# Patient Record
Sex: MALE | Race: WHITE | Employment: FULL TIME | URBAN - NONMETROPOLITAN AREA
[De-identification: names, ages, dates, MRNs, and addresses within clinical notes are randomized per-mention and may not be internally consistent; named-entity substitution may affect disease eponyms.]

---

## 2017-02-28 ENCOUNTER — TELEPHONE (OUTPATIENT)
Dept: FAMILY MEDICINE CLINIC | Age: 42
End: 2017-02-28

## 2017-03-06 ENCOUNTER — HOSPITAL ENCOUNTER (EMERGENCY)
Age: 42
Discharge: HOME | End: 2017-03-06
Payer: COMMERCIAL

## 2017-03-06 ENCOUNTER — APPOINTMENT (OUTPATIENT)
Dept: CT IMAGING | Age: 42
End: 2017-03-06
Payer: COMMERCIAL

## 2017-03-06 ENCOUNTER — APPOINTMENT (OUTPATIENT)
Dept: GENERAL RADIOLOGY | Age: 42
End: 2017-03-06
Payer: COMMERCIAL

## 2017-03-06 VITALS
HEART RATE: 74 BPM | WEIGHT: 170 LBS | DIASTOLIC BLOOD PRESSURE: 80 MMHG | OXYGEN SATURATION: 97 % | SYSTOLIC BLOOD PRESSURE: 117 MMHG | BODY MASS INDEX: 25.18 KG/M2 | RESPIRATION RATE: 20 BRPM | HEIGHT: 69 IN | TEMPERATURE: 98.2 F

## 2017-03-06 DIAGNOSIS — R07.81 RIB PAIN ON RIGHT SIDE: ICD-10-CM

## 2017-03-06 DIAGNOSIS — H81.10 BENIGN PAROXYSMAL POSITIONAL VERTIGO, UNSPECIFIED LATERALITY: Primary | ICD-10-CM

## 2017-03-06 LAB
ALBUMIN SERPL-MCNC: 4.1 G/DL (ref 3.9–4.9)
ALP BLD-CCNC: 67 U/L (ref 35–104)
ALT SERPL-CCNC: 16 U/L (ref 0–41)
AMPHETAMINE SCREEN, URINE: NORMAL
ANION GAP SERPL CALCULATED.3IONS-SCNC: 9 MEQ/L (ref 7–13)
AST SERPL-CCNC: 14 U/L (ref 0–40)
BARBITURATE SCREEN URINE: NORMAL
BASOPHILS ABSOLUTE: 0.1 K/UL (ref 0–0.2)
BASOPHILS RELATIVE PERCENT: 0.8 %
BENZODIAZEPINE SCREEN, URINE: NORMAL
BILIRUB SERPL-MCNC: 0.2 MG/DL (ref 0–1.2)
BILIRUBIN URINE: NEGATIVE
BLOOD, URINE: NEGATIVE
BUN BLDV-MCNC: 14 MG/DL (ref 6–20)
CALCIUM SERPL-MCNC: 8.9 MG/DL (ref 8.6–10.2)
CANNABINOID SCREEN URINE: NORMAL
CHLORIDE BLD-SCNC: 103 MEQ/L (ref 98–107)
CLARITY: CLEAR
CO2: 25 MEQ/L (ref 22–29)
COCAINE METABOLITE SCREEN URINE: NORMAL
COLOR: YELLOW
CREAT SERPL-MCNC: 0.93 MG/DL (ref 0.7–1.2)
EOSINOPHILS ABSOLUTE: 0.1 K/UL (ref 0–0.7)
EOSINOPHILS RELATIVE PERCENT: 1.5 %
GFR AFRICAN AMERICAN: >60
GFR NON-AFRICAN AMERICAN: >60
GLOBULIN: 2.3 G/DL (ref 2.3–3.5)
GLUCOSE BLD-MCNC: 96 MG/DL (ref 74–109)
GLUCOSE URINE: NEGATIVE MG/DL
HCT VFR BLD CALC: 44.4 % (ref 42–52)
HEMOGLOBIN: 14.9 G/DL (ref 14–18)
KETONES, URINE: NEGATIVE MG/DL
LEUKOCYTE ESTERASE, URINE: NEGATIVE
LYMPHOCYTES ABSOLUTE: 4.3 K/UL (ref 1–4.8)
LYMPHOCYTES RELATIVE PERCENT: 46.6 %
Lab: NORMAL
MAGNESIUM: 2.1 MG/DL (ref 1.7–2.3)
MCH RBC QN AUTO: 32.1 PG (ref 27–31.3)
MCHC RBC AUTO-ENTMCNC: 33.6 % (ref 33–37)
MCV RBC AUTO: 95.4 FL (ref 80–100)
MONOCYTES ABSOLUTE: 0.7 K/UL (ref 0.2–0.8)
MONOCYTES RELATIVE PERCENT: 7 %
NEUTROPHILS ABSOLUTE: 4.1 K/UL (ref 1.4–6.5)
NEUTROPHILS RELATIVE PERCENT: 44.1 %
NITRITE, URINE: NEGATIVE
OPIATE SCREEN URINE: NORMAL
PDW BLD-RTO: 13.2 % (ref 11.5–14.5)
PH UA: 6 (ref 5–9)
PHENCYCLIDINE SCREEN URINE: NORMAL
PLATELET # BLD: 272 K/UL (ref 130–400)
POTASSIUM SERPL-SCNC: 3.7 MEQ/L (ref 3.5–5.1)
PROTEIN UA: NEGATIVE MG/DL
RBC # BLD: 4.65 M/UL (ref 4.7–6.1)
SODIUM BLD-SCNC: 137 MEQ/L (ref 132–144)
SPECIFIC GRAVITY UA: 1.03 (ref 1–1.03)
TOTAL CK: 132 U/L (ref 0–190)
TOTAL PROTEIN: 6.4 G/DL (ref 6.4–8.1)
TROPONIN: <0.01 NG/ML (ref 0–0.01)
URINE REFLEX TO CULTURE: NORMAL
UROBILINOGEN, URINE: 0.2 E.U./DL
WBC # BLD: 9.4 K/UL (ref 4.8–10.8)

## 2017-03-06 PROCEDURE — 82550 ASSAY OF CK (CPK): CPT

## 2017-03-06 PROCEDURE — 80307 DRUG TEST PRSMV CHEM ANLYZR: CPT

## 2017-03-06 PROCEDURE — 99284 EMERGENCY DEPT VISIT MOD MDM: CPT

## 2017-03-06 PROCEDURE — 85025 COMPLETE CBC W/AUTO DIFF WBC: CPT

## 2017-03-06 PROCEDURE — 93005 ELECTROCARDIOGRAM TRACING: CPT

## 2017-03-06 PROCEDURE — 70450 CT HEAD/BRAIN W/O DYE: CPT

## 2017-03-06 PROCEDURE — 71101 X-RAY EXAM UNILAT RIBS/CHEST: CPT

## 2017-03-06 PROCEDURE — 81003 URINALYSIS AUTO W/O SCOPE: CPT

## 2017-03-06 PROCEDURE — 84484 ASSAY OF TROPONIN QUANT: CPT

## 2017-03-06 PROCEDURE — 36415 COLL VENOUS BLD VENIPUNCTURE: CPT

## 2017-03-06 PROCEDURE — 6370000000 HC RX 637 (ALT 250 FOR IP): Performed by: PHYSICIAN ASSISTANT

## 2017-03-06 PROCEDURE — 83735 ASSAY OF MAGNESIUM: CPT

## 2017-03-06 PROCEDURE — 80053 COMPREHEN METABOLIC PANEL: CPT

## 2017-03-06 RX ORDER — IBUPROFEN 800 MG/1
800 TABLET ORAL EVERY 8 HOURS PRN
Qty: 20 TABLET | Refills: 0 | Status: SHIPPED | OUTPATIENT
Start: 2017-03-06 | End: 2017-07-20 | Stop reason: SDUPTHER

## 2017-03-06 RX ORDER — MECLIZINE HYDROCHLORIDE 25 MG/1
25 TABLET ORAL ONCE
Status: COMPLETED | OUTPATIENT
Start: 2017-03-06 | End: 2017-03-06

## 2017-03-06 RX ORDER — MECLIZINE HYDROCHLORIDE 25 MG/1
25 TABLET ORAL 3 TIMES DAILY PRN
Qty: 20 TABLET | Refills: 0 | Status: SHIPPED | OUTPATIENT
Start: 2017-03-06 | End: 2017-03-16

## 2017-03-06 RX ADMIN — MECLIZINE HYDROCHLORIDE 25 MG: 25 TABLET ORAL at 18:46

## 2017-03-06 ASSESSMENT — ENCOUNTER SYMPTOMS
PHOTOPHOBIA: 0
VOICE CHANGE: 0
ABDOMINAL DISTENTION: 0
APNEA: 0
VOMITING: 0
COUGH: 0
BACK PAIN: 0
ANAL BLEEDING: 0
NAUSEA: 1
EYE DISCHARGE: 0

## 2017-03-07 LAB
EKG ATRIAL RATE: 64 BPM
EKG P AXIS: 65 DEGREES
EKG P-R INTERVAL: 136 MS
EKG Q-T INTERVAL: 390 MS
EKG QRS DURATION: 98 MS
EKG QTC CALCULATION (BAZETT): 402 MS
EKG R AXIS: 77 DEGREES
EKG T AXIS: 60 DEGREES
EKG VENTRICULAR RATE: 64 BPM

## 2017-06-26 ENCOUNTER — OFFICE VISIT (OUTPATIENT)
Dept: FAMILY MEDICINE CLINIC | Age: 42
End: 2017-06-26

## 2017-06-26 VITALS
SYSTOLIC BLOOD PRESSURE: 126 MMHG | WEIGHT: 177.4 LBS | TEMPERATURE: 98.3 F | HEIGHT: 69 IN | BODY MASS INDEX: 26.28 KG/M2 | HEART RATE: 68 BPM | OXYGEN SATURATION: 98 % | DIASTOLIC BLOOD PRESSURE: 88 MMHG

## 2017-06-26 DIAGNOSIS — R30.0 BURNING WITH URINATION: Primary | ICD-10-CM

## 2017-06-26 DIAGNOSIS — N45.1 EPIDIDYMITIS: ICD-10-CM

## 2017-06-26 DIAGNOSIS — N41.9 PROSTATITIS, UNSPECIFIED PROSTATITIS TYPE: ICD-10-CM

## 2017-06-26 LAB
BILIRUBIN, POC: ABNORMAL
BLOOD URINE, POC: ABNORMAL
CLARITY, POC: CLEAR
COLOR, POC: YELLOW
GLUCOSE URINE, POC: ABNORMAL
KETONES, POC: ABNORMAL
LEUKOCYTE EST, POC: ABNORMAL
NITRITE, POC: ABNORMAL
PH, POC: 5
PROTEIN, POC: ABNORMAL
SPECIFIC GRAVITY, POC: 1.01
UROBILINOGEN, POC: 0.2

## 2017-06-26 PROCEDURE — 81003 URINALYSIS AUTO W/O SCOPE: CPT | Performed by: FAMILY MEDICINE

## 2017-06-26 PROCEDURE — 99213 OFFICE O/P EST LOW 20 MIN: CPT | Performed by: FAMILY MEDICINE

## 2017-06-26 RX ORDER — CIPROFLOXACIN 500 MG/1
TABLET, FILM COATED ORAL
Refills: 0 | COMMUNITY
Start: 2017-06-21 | End: 2017-07-20 | Stop reason: SDUPTHER

## 2017-06-28 LAB — URINE CULTURE, ROUTINE: NORMAL

## 2017-07-20 ENCOUNTER — PATIENT MESSAGE (OUTPATIENT)
Dept: FAMILY MEDICINE CLINIC | Age: 42
End: 2017-07-20

## 2017-07-20 RX ORDER — IBUPROFEN 800 MG/1
800 TABLET ORAL EVERY 8 HOURS PRN
Qty: 20 TABLET | Refills: 0 | Status: SHIPPED | OUTPATIENT
Start: 2017-07-20 | End: 2018-05-01

## 2017-07-20 RX ORDER — CIPROFLOXACIN 500 MG/1
TABLET, FILM COATED ORAL
Qty: 28 TABLET | Refills: 0 | Status: SHIPPED | OUTPATIENT
Start: 2017-07-20 | End: 2017-11-30 | Stop reason: SDUPTHER

## 2017-11-29 ENCOUNTER — PATIENT MESSAGE (OUTPATIENT)
Dept: FAMILY MEDICINE CLINIC | Age: 42
End: 2017-11-29

## 2017-11-29 DIAGNOSIS — N48.89 PENILE PAIN: Primary | ICD-10-CM

## 2017-11-30 RX ORDER — CIPROFLOXACIN 500 MG/1
TABLET, FILM COATED ORAL
Qty: 28 TABLET | Refills: 0 | Status: SHIPPED | OUTPATIENT
Start: 2017-11-30 | End: 2018-05-01

## 2018-01-16 ENCOUNTER — PATIENT MESSAGE (OUTPATIENT)
Dept: FAMILY MEDICINE CLINIC | Age: 43
End: 2018-01-16

## 2018-01-16 DIAGNOSIS — B00.9 HERPES SIMPLEX: ICD-10-CM

## 2018-01-16 RX ORDER — VALACYCLOVIR HYDROCHLORIDE 500 MG/1
500 TABLET, FILM COATED ORAL 2 TIMES DAILY
Qty: 6 TABLET | Refills: 0 | Status: SHIPPED | OUTPATIENT
Start: 2018-01-16 | End: 2018-01-19

## 2018-05-01 ENCOUNTER — OFFICE VISIT (OUTPATIENT)
Dept: FAMILY MEDICINE CLINIC | Age: 43
End: 2018-05-01
Payer: COMMERCIAL

## 2018-05-01 VITALS
HEIGHT: 69 IN | SYSTOLIC BLOOD PRESSURE: 118 MMHG | BODY MASS INDEX: 27.46 KG/M2 | OXYGEN SATURATION: 98 % | DIASTOLIC BLOOD PRESSURE: 74 MMHG | HEART RATE: 62 BPM | WEIGHT: 185.4 LBS

## 2018-05-01 DIAGNOSIS — G89.29 CHRONIC INTRACTABLE HEADACHE, UNSPECIFIED HEADACHE TYPE: ICD-10-CM

## 2018-05-01 DIAGNOSIS — R25.1 TREMOR: ICD-10-CM

## 2018-05-01 DIAGNOSIS — Z12.5 SCREENING PSA (PROSTATE SPECIFIC ANTIGEN): ICD-10-CM

## 2018-05-01 DIAGNOSIS — R68.82 LOW LIBIDO: ICD-10-CM

## 2018-05-01 DIAGNOSIS — R51.9 CHRONIC INTRACTABLE HEADACHE, UNSPECIFIED HEADACHE TYPE: ICD-10-CM

## 2018-05-01 DIAGNOSIS — H53.9 VISION CHANGES: ICD-10-CM

## 2018-05-01 DIAGNOSIS — R53.83 FATIGUE, UNSPECIFIED TYPE: ICD-10-CM

## 2018-05-01 DIAGNOSIS — R53.83 FATIGUE, UNSPECIFIED TYPE: Primary | ICD-10-CM

## 2018-05-01 LAB
ALBUMIN SERPL-MCNC: 4.4 G/DL (ref 3.9–4.9)
ALP BLD-CCNC: 78 U/L (ref 35–104)
ALT SERPL-CCNC: 17 U/L (ref 0–41)
ANION GAP SERPL CALCULATED.3IONS-SCNC: 14 MEQ/L (ref 7–13)
AST SERPL-CCNC: 18 U/L (ref 0–40)
BILIRUB SERPL-MCNC: <0.2 MG/DL (ref 0–1.2)
BUN BLDV-MCNC: 14 MG/DL (ref 6–20)
CALCIUM SERPL-MCNC: 8.8 MG/DL (ref 8.6–10.2)
CHLORIDE BLD-SCNC: 100 MEQ/L (ref 98–107)
CO2: 24 MEQ/L (ref 22–29)
CREAT SERPL-MCNC: 0.8 MG/DL (ref 0.7–1.2)
GFR AFRICAN AMERICAN: >60
GFR NON-AFRICAN AMERICAN: >60
GLOBULIN: 2.5 G/DL (ref 2.3–3.5)
GLUCOSE BLD-MCNC: 85 MG/DL (ref 74–109)
HCT VFR BLD CALC: 44.4 % (ref 42–52)
HEMOGLOBIN: 15.3 G/DL (ref 14–18)
MCH RBC QN AUTO: 33.6 PG (ref 27–31.3)
MCHC RBC AUTO-ENTMCNC: 34.4 % (ref 33–37)
MCV RBC AUTO: 97.8 FL (ref 80–100)
PDW BLD-RTO: 13.3 % (ref 11.5–14.5)
PLATELET # BLD: 301 K/UL (ref 130–400)
POTASSIUM SERPL-SCNC: 4.6 MEQ/L (ref 3.5–5.1)
PROSTATE SPECIFIC ANTIGEN: 0.49 NG/ML
RBC # BLD: 4.55 M/UL (ref 4.7–6.1)
SODIUM BLD-SCNC: 138 MEQ/L (ref 132–144)
TOTAL PROTEIN: 6.9 G/DL (ref 6.4–8.1)
TSH SERPL DL<=0.05 MIU/L-ACNC: 2.83 UIU/ML (ref 0.27–4.2)
WBC # BLD: 10 K/UL (ref 4.8–10.8)

## 2018-05-01 PROCEDURE — 99213 OFFICE O/P EST LOW 20 MIN: CPT | Performed by: FAMILY MEDICINE

## 2018-05-01 ASSESSMENT — PATIENT HEALTH QUESTIONNAIRE - PHQ9
1. LITTLE INTEREST OR PLEASURE IN DOING THINGS: 0
SUM OF ALL RESPONSES TO PHQ9 QUESTIONS 1 & 2: 0
SUM OF ALL RESPONSES TO PHQ QUESTIONS 1-9: 0
2. FEELING DOWN, DEPRESSED OR HOPELESS: 0

## 2018-05-03 LAB — TESTOSTERONE TOTAL-MALE: 382 NG/DL (ref 300–890)

## 2018-06-07 ENCOUNTER — TELEPHONE (OUTPATIENT)
Dept: FAMILY MEDICINE CLINIC | Age: 43
End: 2018-06-07

## 2018-08-20 ENCOUNTER — OFFICE VISIT (OUTPATIENT)
Dept: FAMILY MEDICINE CLINIC | Age: 43
End: 2018-08-20
Payer: COMMERCIAL

## 2018-08-20 VITALS
BODY MASS INDEX: 27.55 KG/M2 | WEIGHT: 186 LBS | OXYGEN SATURATION: 97 % | HEART RATE: 81 BPM | SYSTOLIC BLOOD PRESSURE: 110 MMHG | HEIGHT: 69 IN | TEMPERATURE: 98.6 F | DIASTOLIC BLOOD PRESSURE: 62 MMHG

## 2018-08-20 DIAGNOSIS — M54.50 LUMBAR PAIN: ICD-10-CM

## 2018-08-20 DIAGNOSIS — M25.551 PAIN OF BOTH HIP JOINTS: ICD-10-CM

## 2018-08-20 DIAGNOSIS — M25.552 PAIN OF BOTH HIP JOINTS: ICD-10-CM

## 2018-08-20 DIAGNOSIS — N41.1 CHRONIC PROSTATITIS: ICD-10-CM

## 2018-08-20 DIAGNOSIS — K62.89 RECTAL PAIN: ICD-10-CM

## 2018-08-20 DIAGNOSIS — N41.0 ACUTE PROSTATITIS: Primary | ICD-10-CM

## 2018-08-20 LAB
BILIRUBIN, POC: NORMAL
BLOOD URINE, POC: NORMAL
CLARITY, POC: NORMAL
COLOR, POC: NORMAL
GLUCOSE URINE, POC: NORMAL
KETONES, POC: NORMAL
LEUKOCYTE EST, POC: NORMAL
NITRITE, POC: NORMAL
PH, POC: 6
PROTEIN, POC: NORMAL
SPECIFIC GRAVITY, POC: 1.03
UROBILINOGEN, POC: 0.2

## 2018-08-20 PROCEDURE — 81002 URINALYSIS NONAUTO W/O SCOPE: CPT | Performed by: NURSE PRACTITIONER

## 2018-08-20 PROCEDURE — 99214 OFFICE O/P EST MOD 30 MIN: CPT | Performed by: NURSE PRACTITIONER

## 2018-08-20 RX ORDER — SULFAMETHOXAZOLE AND TRIMETHOPRIM 800; 160 MG/1; MG/1
1 TABLET ORAL 2 TIMES DAILY
Qty: 28 TABLET | Refills: 0 | Status: SHIPPED | OUTPATIENT
Start: 2018-08-20 | End: 2018-09-03

## 2018-08-20 ASSESSMENT — ENCOUNTER SYMPTOMS
RESPIRATORY NEGATIVE: 1
NAUSEA: 0
VOMITING: 0

## 2018-08-20 NOTE — PROGRESS NOTES
Social History     Social History    Marital status:      Spouse name: N/A    Number of children: N/A    Years of education: N/A     Social History Main Topics    Smoking status: Former Smoker     Packs/day: 1.00     Years: 23.00     Types: Cigarettes     Quit date: 6/12/2018    Smokeless tobacco: Never Used    Alcohol use Yes      Comment: rarely    Drug use: No    Sexual activity: Not Asked     Other Topics Concern    None     Social History Narrative    None     No current outpatient prescriptions on file prior to visit. No current facility-administered medications on file prior to visit. Allergies   Allergen Reactions    Aspirin Nausea Only       Review of Systems   Constitutional: Positive for chills. HENT: Negative. Respiratory: Negative. Cardiovascular: Negative. Gastrointestinal: Negative for nausea and vomiting. Genitourinary: Positive for flank pain (with HIP pain), frequency, hesitancy and urgency. Negative for hematuria. Skin: Negative. Neurological: Negative. Psychiatric/Behavioral: Negative. Objective  Vitals:    08/20/18 1346   BP: 110/62   Pulse: 81   Temp: 98.6 °F (37 °C)   SpO2: 97%   Weight: 186 lb (84.4 kg)   Height: 5' 9\" (1.753 m)     Physical Exam   Constitutional: He is oriented to person, place, and time. He appears well-developed and well-nourished. HENT:   Head: Normocephalic and atraumatic. Right Ear: External ear normal.   Left Ear: External ear normal.   Eyes: Pupils are equal, round, and reactive to light. Conjunctivae are normal.   Neck: Normal range of motion. Neck supple. Cardiovascular: Normal rate and regular rhythm. Pulmonary/Chest: Effort normal and breath sounds normal.   Abdominal: Soft. Bowel sounds are normal.   Musculoskeletal:        Right hip: He exhibits tenderness. He exhibits normal range of motion, normal strength, no bony tenderness, no swelling, no crepitus, no deformity and no laceration.

## 2018-08-22 LAB — URINE CULTURE, ROUTINE: NORMAL

## 2018-08-28 ENCOUNTER — TELEPHONE (OUTPATIENT)
Dept: FAMILY MEDICINE CLINIC | Age: 43
End: 2018-08-28

## 2018-11-05 ENCOUNTER — OFFICE VISIT (OUTPATIENT)
Dept: FAMILY MEDICINE CLINIC | Age: 43
End: 2018-11-05
Payer: COMMERCIAL

## 2018-11-05 VITALS
OXYGEN SATURATION: 99 % | DIASTOLIC BLOOD PRESSURE: 74 MMHG | HEART RATE: 72 BPM | SYSTOLIC BLOOD PRESSURE: 120 MMHG | BODY MASS INDEX: 27.93 KG/M2 | WEIGHT: 188.6 LBS | HEIGHT: 69 IN | TEMPERATURE: 98.5 F

## 2018-11-05 DIAGNOSIS — J20.9 ACUTE BRONCHITIS, UNSPECIFIED ORGANISM: Primary | ICD-10-CM

## 2018-11-05 PROCEDURE — 99214 OFFICE O/P EST MOD 30 MIN: CPT | Performed by: FAMILY MEDICINE

## 2018-11-05 RX ORDER — FLUTICASONE PROPIONATE 50 MCG
1 SPRAY, SUSPENSION (ML) NASAL DAILY
COMMUNITY
End: 2019-05-01 | Stop reason: ALTCHOICE

## 2018-11-05 RX ORDER — BENZONATATE 100 MG/1
100 CAPSULE ORAL
Refills: 0 | COMMUNITY
Start: 2018-11-03 | End: 2019-05-01 | Stop reason: ALTCHOICE

## 2018-11-05 RX ORDER — AZITHROMYCIN 250 MG/1
250 TABLET, FILM COATED ORAL DAILY
Qty: 6 TABLET | Refills: 0 | Status: SHIPPED | OUTPATIENT
Start: 2018-11-05 | End: 2019-05-01

## 2018-11-05 RX ORDER — PROMETHAZINE HYDROCHLORIDE AND CODEINE PHOSPHATE 6.25; 1 MG/5ML; MG/5ML
5 SYRUP ORAL EVERY 4 HOURS PRN
Qty: 118 ML | Refills: 0 | Status: SHIPPED | OUTPATIENT
Start: 2018-11-05 | End: 2018-11-12

## 2018-11-05 RX ORDER — RANITIDINE 300 MG/1
TABLET ORAL
Refills: 0 | COMMUNITY
Start: 2018-11-03 | End: 2018-11-05

## 2018-11-05 ASSESSMENT — ENCOUNTER SYMPTOMS
SORE THROAT: 0
COUGH: 1
SHORTNESS OF BREATH: 0

## 2019-05-01 ENCOUNTER — OFFICE VISIT (OUTPATIENT)
Dept: FAMILY MEDICINE CLINIC | Age: 44
End: 2019-05-01
Payer: COMMERCIAL

## 2019-05-01 VITALS
OXYGEN SATURATION: 98 % | WEIGHT: 194.4 LBS | SYSTOLIC BLOOD PRESSURE: 124 MMHG | HEIGHT: 69 IN | HEART RATE: 83 BPM | BODY MASS INDEX: 28.79 KG/M2 | DIASTOLIC BLOOD PRESSURE: 80 MMHG | TEMPERATURE: 97.9 F

## 2019-05-01 DIAGNOSIS — M25.512 CHRONIC LEFT SHOULDER PAIN: ICD-10-CM

## 2019-05-01 DIAGNOSIS — H18.529 CORNEAL EPITHELIAL AND BASEMENT MEMBRANE DYSTROPHY: ICD-10-CM

## 2019-05-01 DIAGNOSIS — G89.29 CHRONIC LEFT SHOULDER PAIN: ICD-10-CM

## 2019-05-01 DIAGNOSIS — J34.89 NASAL SORE: Primary | ICD-10-CM

## 2019-05-01 PROCEDURE — 99214 OFFICE O/P EST MOD 30 MIN: CPT | Performed by: NURSE PRACTITIONER

## 2019-05-01 RX ORDER — MELOXICAM 15 MG/1
15 TABLET ORAL DAILY
Qty: 30 TABLET | Refills: 0 | Status: SHIPPED | OUTPATIENT
Start: 2019-05-01 | End: 2020-03-12

## 2019-05-01 RX ORDER — VALACYCLOVIR HYDROCHLORIDE 500 MG/1
500 TABLET, FILM COATED ORAL DAILY
Qty: 30 TABLET | Refills: 0 | Status: SHIPPED | OUTPATIENT
Start: 2019-05-01 | End: 2019-09-05 | Stop reason: SDUPTHER

## 2019-05-01 ASSESSMENT — PATIENT HEALTH QUESTIONNAIRE - PHQ9
2. FEELING DOWN, DEPRESSED OR HOPELESS: 0
SUM OF ALL RESPONSES TO PHQ QUESTIONS 1-9: 0
SUM OF ALL RESPONSES TO PHQ QUESTIONS 1-9: 0
SUM OF ALL RESPONSES TO PHQ9 QUESTIONS 1 & 2: 0
1. LITTLE INTEREST OR PLEASURE IN DOING THINGS: 0

## 2019-05-01 ASSESSMENT — ENCOUNTER SYMPTOMS
EYE ITCHING: 1
EYE PAIN: 1
EYE DISCHARGE: 0
PHOTOPHOBIA: 1
EYE REDNESS: 1
RESPIRATORY NEGATIVE: 1

## 2019-05-01 NOTE — PROGRESS NOTES
Subjective  Chief Complaint   Patient presents with    Foreign Body in Nose     pt states that he has been struggling for some time with sores in nose. pt states that he is unable to associate them with anything. pt states that at times there is an oder to them?  Eye Pain     pt states that about 1 yr ago he was having swelling and pain in LT eye lid. seen [facility] and then specialist. pt states since he has blurred vision and sensativity to light. pt states that when this is occurs is nose runs!  Sinus Problem    Shoulder Pain     LT shoulder, about 4-5 mos. pt denies injury, thinks he pulled muscle        HPI    Sores in the nose consistently. Left side worse. Has not taken anything for it. Feels like they are getting worse. Has hx of cold sores on lips previously. Once every 2 years or so. Has issue with left eye starting about a year ago  Epithelial basement membrane syndrome. Started around the same time that the nose sores started. C/o intermittent blurriness in the eye. Also c/o left shoulder pain that has been persistent. Has not tried anything for it. There are no active problems to display for this patient.     Past Medical History:   Diagnosis Date    Genital HSV     Tobacco abuse      Past Surgical History:   Procedure Laterality Date    VASECTOMY      WISDOM TOOTH EXTRACTION       Family History   Problem Relation Age of Onset    High Blood Pressure Mother     Cancer Maternal Grandmother         breast    Cancer Maternal Grandfather      Social History     Socioeconomic History    Marital status:      Spouse name: None    Number of children: None    Years of education: None    Highest education level: None   Occupational History    None   Social Needs    Financial resource strain: None    Food insecurity:     Worry: None     Inability: None    Transportation needs:     Medical: None     Non-medical: None   Tobacco Use    Smoking status: Former Smoker Packs/day: 1.00     Years: 23.00     Pack years: 23.00     Types: Cigarettes     Last attempt to quit: 2018     Years since quittin.8    Smokeless tobacco: Never Used   Substance and Sexual Activity    Alcohol use: Yes     Comment: rarely    Drug use: No    Sexual activity: None   Lifestyle    Physical activity:     Days per week: None     Minutes per session: None    Stress: None   Relationships    Social connections:     Talks on phone: None     Gets together: None     Attends Sikhism service: None     Active member of club or organization: None     Attends meetings of clubs or organizations: None     Relationship status: None    Intimate partner violence:     Fear of current or ex partner: None     Emotionally abused: None     Physically abused: None     Forced sexual activity: None   Other Topics Concern    None   Social History Narrative    None     No current outpatient medications on file prior to visit. No current facility-administered medications on file prior to visit. Allergies   Allergen Reactions    Aspirin Nausea Only       Review of Systems   Constitutional: Negative. HENT: Negative. Eyes: Positive for photophobia (left eye), pain (left eye, mild), redness (left eye) and itching (left eye, mild). Negative for discharge. Respiratory: Negative. Cardiovascular: Negative. Gastrointestinal: Abdominal pain: RUQ and RLQ. Endocrine: Negative. Genitourinary:        \"groin pain\"    Musculoskeletal: Myalgias: across abdomen, groin and lower back    Skin: Positive for rash (along nose). Neurological: Negative. Hematological: Negative. Psychiatric/Behavioral: Negative. Objective  Vitals:    19 1749   BP: 124/80   Pulse: 83   Temp: 97.9 °F (36.6 °C)   SpO2: 98%   Weight: 194 lb 6.4 oz (88.2 kg)   Height: 5' 9\" (1.753 m)     Physical Exam   Constitutional: He is oriented to person, place, and time. He appears well-developed and well-nourished. HENT:   Head: Normocephalic and atraumatic. Right Ear: External ear normal.   Left Ear: External ear normal.   Mouth/Throat: Oropharynx is clear and moist.   Red lesion along left side of nose and inside left nostril. Eyes: Pupils are equal, round, and reactive to light. EOM are normal. Left eye exhibits no discharge. Left eye red bump along lid margin. Neck: Neck supple. No thyromegaly present. Cardiovascular: Normal rate, regular rhythm, normal heart sounds and intact distal pulses. Pulmonary/Chest: Effort normal and breath sounds normal.   Musculoskeletal:        Left shoulder: He exhibits decreased range of motion, tenderness and pain. He exhibits no bony tenderness, no swelling, no effusion, no crepitus, no deformity, no laceration, no spasm, normal pulse and normal strength. Lymphadenopathy:     He has no cervical adenopathy. Neurological: He is alert and oriented to person, place, and time. Skin: Skin is warm and dry. Capillary refill takes less than 2 seconds. Psychiatric: He has a normal mood and affect. His behavior is normal. Judgment and thought content normal.         Assessment & Plan     Diagnosis Orders   1. Nasal sore  Amb External Referral To ENT    valACYclovir (VALTREX) 500 MG tablet   2. Corneal epithelial and basement membrane dystrophy  External Referral to Ophthalmology   3.  Chronic left shoulder pain  meloxicam (MOBIC) 15 MG tablet       Orders Placed This Encounter   Procedures    Amb External Referral To ENT     Referral Priority:   Routine     Referral Type:   Eval and Treat     Referral Reason:   Specialty Services Required     Referred to Provider:   Annita Benitez MD     Requested Specialty:   Otolaryngology     Number of Visits Requested:   1    External Referral to Ophthalmology     Referral Priority:   Routine     Referral Type:   Eval and Treat     Referral Reason:   Specialty Services Required     Requested Specialty:   Ophthalmology     Number of Visits Requested:   1       Orders Placed This Encounter   Medications    valACYclovir (VALTREX) 500 MG tablet     Sig: Take 1 tablet by mouth daily     Dispense:  30 tablet     Refill:  0    meloxicam (MOBIC) 15 MG tablet     Sig: Take 1 tablet by mouth daily     Dispense:  30 tablet     Refill:  0     Side effects, adverse effects of the medication prescribed today, as well as treatment plan/ rationale and result expectations have been discussed with the patient who expresses understanding and desires to proceed. Close follow up to evaluate treatment results and for coordination of care. I have reviewed the patient's medical history in detail and updated the computerized patient record. As always, patient is advised that if symptoms worsen in any way they will proceed to the nearest emergency room.      Fu here after specialist appts    BRAYAN Staples - CNP

## 2019-07-09 ENCOUNTER — TELEPHONE (OUTPATIENT)
Dept: FAMILY MEDICINE CLINIC | Age: 44
End: 2019-07-09

## 2019-07-09 DIAGNOSIS — M25.512 ACUTE PAIN OF LEFT SHOULDER: Primary | ICD-10-CM

## 2019-07-09 NOTE — TELEPHONE ENCOUNTER
Pt calling back stating that he would like the referral to ortho and proceed with them. Please advise.

## 2019-09-05 DIAGNOSIS — J34.89 NASAL SORE: ICD-10-CM

## 2019-09-05 RX ORDER — VALACYCLOVIR HYDROCHLORIDE 500 MG/1
500 TABLET, FILM COATED ORAL DAILY
Qty: 30 TABLET | Refills: 0 | Status: SHIPPED | OUTPATIENT
Start: 2019-09-05 | End: 2020-01-23 | Stop reason: SDUPTHER

## 2020-01-23 RX ORDER — VALACYCLOVIR HYDROCHLORIDE 500 MG/1
500 TABLET, FILM COATED ORAL DAILY
Qty: 30 TABLET | Refills: 0 | Status: SHIPPED | OUTPATIENT
Start: 2020-01-23 | End: 2020-03-12

## 2020-03-05 ENCOUNTER — PATIENT MESSAGE (OUTPATIENT)
Dept: FAMILY MEDICINE CLINIC | Age: 45
End: 2020-03-05

## 2020-03-05 RX ORDER — OSELTAMIVIR PHOSPHATE 75 MG/1
75 CAPSULE ORAL DAILY
Qty: 10 CAPSULE | Refills: 0 | Status: SHIPPED | OUTPATIENT
Start: 2020-03-05 | End: 2020-03-15

## 2020-03-05 NOTE — TELEPHONE ENCOUNTER
From: Sarbjit Esparza  To: Jasson Fragoso MD  Sent: 3/5/2020 5:24 PM EST  Subject: Non-Urgent Medical Question    Our granddaughter has Influenza A. She lives with us. Her doctor told me to get a script from you for Tamaflu as a preventive measure. Can you prescribe? Fyi. ..i got the flu shot this past Jan at [facility] in [place].    Thanks.     Blanquita Rocha

## 2020-03-12 ENCOUNTER — OFFICE VISIT (OUTPATIENT)
Dept: FAMILY MEDICINE CLINIC | Age: 45
End: 2020-03-12
Payer: COMMERCIAL

## 2020-03-12 VITALS
RESPIRATION RATE: 15 BRPM | SYSTOLIC BLOOD PRESSURE: 132 MMHG | BODY MASS INDEX: 33.22 KG/M2 | HEIGHT: 64 IN | HEART RATE: 82 BPM | DIASTOLIC BLOOD PRESSURE: 84 MMHG | OXYGEN SATURATION: 94 % | WEIGHT: 194.6 LBS | TEMPERATURE: 98.4 F

## 2020-03-12 PROCEDURE — 99213 OFFICE O/P EST LOW 20 MIN: CPT | Performed by: NURSE PRACTITIONER

## 2020-03-12 RX ORDER — BENZONATATE 100 MG/1
100-200 CAPSULE ORAL 3 TIMES DAILY PRN
Qty: 60 CAPSULE | Refills: 0 | Status: SHIPPED | OUTPATIENT
Start: 2020-03-12 | End: 2020-03-22

## 2020-03-12 RX ORDER — VALACYCLOVIR HYDROCHLORIDE 1 G/1
TABLET, FILM COATED ORAL
COMMUNITY
Start: 2020-02-23

## 2020-03-12 RX ORDER — AMOXICILLIN AND CLAVULANATE POTASSIUM 875; 125 MG/1; MG/1
1 TABLET, FILM COATED ORAL 2 TIMES DAILY
Qty: 20 TABLET | Refills: 0 | Status: SHIPPED | OUTPATIENT
Start: 2020-03-12 | End: 2020-03-22

## 2020-03-12 ASSESSMENT — PATIENT HEALTH QUESTIONNAIRE - PHQ9
SUM OF ALL RESPONSES TO PHQ QUESTIONS 1-9: 0
2. FEELING DOWN, DEPRESSED OR HOPELESS: 0
1. LITTLE INTEREST OR PLEASURE IN DOING THINGS: 0
SUM OF ALL RESPONSES TO PHQ QUESTIONS 1-9: 0
SUM OF ALL RESPONSES TO PHQ9 QUESTIONS 1 & 2: 0

## 2020-03-12 NOTE — PROGRESS NOTES
Subjective:      Patient ID: Veronika Ratliff is a 38 y.o. male who presents today for:  Chief Complaint   Patient presents with    Pharyngitis     Patient present today with C/O cough and sore throat. Paitent states that last week trisha traveled to [facility] and states that he took his grandchild to urgent care and she was tested postive. Trisha states that he is on tamaflu as a precaution and states that this has been going on sense .      Cough       HPI    Granddaughter had flu-Provider gave Tamiflu preventative  Fri/Sat Tamiflu was started, one pill left   evening a head cold  Last night felt pretty good  This morning-cough, feels like its in my chest, the outside of my ears are hot  Never had body aches  Felt hot a couple times-but never checked temp            Past Medical History:   Diagnosis Date    Genital HSV     Tobacco abuse      Past Surgical History:   Procedure Laterality Date    VASECTOMY      WISDOM TOOTH EXTRACTION       Social History     Socioeconomic History    Marital status:      Spouse name: Not on file    Number of children: Not on file    Years of education: Not on file    Highest education level: Not on file   Occupational History    Not on file   Social Needs    Financial resource strain: Not on file    Food insecurity     Worry: Not on file     Inability: Not on file    Transportation needs     Medical: Not on file     Non-medical: Not on file   Tobacco Use    Smoking status: Former Smoker     Packs/day: 1.00     Years: 23.00     Pack years: 23.00     Types: Cigarettes     Last attempt to quit: 2018     Years since quittin.7    Smokeless tobacco: Never Used   Substance and Sexual Activity    Alcohol use: Yes     Comment: rarely    Drug use: No    Sexual activity: Not on file   Lifestyle    Physical activity     Days per week: Not on file     Minutes per session: Not on file    Stress: Not on file   Relationships    Social connections     Talks Negative for arthralgias, back pain, gait problem and neck pain. Skin: Negative. Neurological: Negative for dizziness, weakness and headaches. Hematological: Negative for adenopathy. Psychiatric/Behavioral: Negative for agitation and confusion. Objective:   /84   Pulse 82   Temp 98.4 °F (36.9 °C) (Temporal)   Resp 15   Ht 5' 4\" (1.626 m)   Wt 194 lb 9.6 oz (88.3 kg)   SpO2 94%   BMI 33.40 kg/m²     Physical Exam  Vitals signs reviewed. Constitutional:       Appearance: Normal appearance. HENT:      Head: Normocephalic and atraumatic. Right Ear: Hearing, tympanic membrane, ear canal and external ear normal. No middle ear effusion. No foreign body. Tympanic membrane is not injected, erythematous or bulging. Left Ear: Hearing, tympanic membrane, ear canal and external ear normal.  No middle ear effusion. No foreign body. Tympanic membrane is not injected, erythematous or bulging. Nose: Nose normal. No congestion or rhinorrhea. Right Nostril: No foreign body. Left Nostril: No foreign body. Right Turbinates: Not enlarged. Left Turbinates: Not enlarged. Right Sinus: No maxillary sinus tenderness or frontal sinus tenderness. Left Sinus: No maxillary sinus tenderness or frontal sinus tenderness. Mouth/Throat:      Lips: Pink. Mouth: Mucous membranes are moist.      Pharynx: Oropharynx is clear. Uvula midline. No pharyngeal swelling, oropharyngeal exudate, posterior oropharyngeal erythema or uvula swelling. Tonsils: No tonsillar exudate or tonsillar abscesses. Eyes:      General: Lids are normal.         Right eye: No foreign body. Left eye: No foreign body. Extraocular Movements: Extraocular movements intact. Conjunctiva/sclera: Conjunctivae normal.   Neck:      Musculoskeletal: Normal range of motion. Cardiovascular:      Rate and Rhythm: Normal rate and regular rhythm. Heart sounds: Normal heart sounds. Pulmonary:      Effort: Pulmonary effort is normal. No tachypnea, accessory muscle usage or respiratory distress. Breath sounds: Normal breath sounds. No wheezing or rhonchi. Abdominal:      Tenderness: There is no abdominal tenderness. There is no guarding. Musculoskeletal: Normal range of motion. Lymphadenopathy:      Cervical: No cervical adenopathy. Upper Body:      Right upper body: No supraclavicular adenopathy. Left upper body: No supraclavicular adenopathy. Skin:     General: Skin is warm and dry. Capillary Refill: Capillary refill takes less than 2 seconds. Neurological:      General: No focal deficit present. Mental Status: He is alert and oriented to person, place, and time. Cranial Nerves: Cranial nerves are intact. Gait: Gait is intact. Psychiatric:         Mood and Affect: Mood normal.         Speech: Speech normal.         Behavior: Behavior normal. Behavior is cooperative. Thought Content: Thought content normal.         Judgment: Judgment normal.         Assessment:       Diagnosis Orders   1. URI with cough and congestion  amoxicillin-clavulanate (AUGMENTIN) 875-125 MG per tablet    benzonatate (TESSALON) 100 MG capsule     No results found for this visit on 03/12/20. Plan:   Due to length of illness and waxing and waning will treat patient with an antibiotic. Assessment & Plan     Blanquita Rocha was seen today for pharyngitis and cough. Diagnoses and all orders for this visit:    URI with cough and congestion  -     amoxicillin-clavulanate (AUGMENTIN) 875-125 MG per tablet; Take 1 tablet by mouth 2 times daily for 10 days  -     benzonatate (TESSALON) 100 MG capsule; Take 1-2 capsules by mouth 3 times daily as needed for Cough      No orders of the defined types were placed in this encounter.     Orders Placed This Encounter   Medications    amoxicillin-clavulanate (AUGMENTIN) 875-125 MG per tablet     Sig: Take 1 tablet by mouth 2 times daily for 10 days     Dispense:  20 tablet     Refill:  0    benzonatate (TESSALON) 100 MG capsule     Sig: Take 1-2 capsules by mouth 3 times daily as needed for Cough     Dispense:  60 capsule     Refill:  0     Medications Discontinued During This Encounter   Medication Reason    meloxicam (MOBIC) 15 MG tablet LIST CLEANUP    valACYclovir (VALTREX) 500 MG tablet LIST CLEANUP     Return if symptoms worsen or fail to improve. Reviewed with the patient/family: current clinical status & medications. Side effects of the medication prescribed today, as well as treatment plan/rationale and result expectations have been discussed with the patient/family who expresses understanding. Patient will be discharged home in stable condition. Follow up with PCP to evaluate treatment results or return if symptoms worsen or fail to improve. Discussed signs and symptoms which require immediate follow-up in ED/call to 911. Understanding verbalized. I have reviewed the patient's medical history in detail and updated the computerized patient record.     Thais Gilmore, APRN - CNP

## 2020-03-12 NOTE — PATIENT INSTRUCTIONS
drink plenty of fluids and stay hydrated. Warm or cool air humidifiers are good to sleep with at night. Saline spray 8-10 times per day or Neti Pot 2-3 times a day will help with nasal congestion. Make take Tylenol or Motrin as needed for pain, fever, or inflammation unless you've been told not to in the past.   Patient Education        Learning About the Safe Use of Antibiotics  Introduction    Antibiotics are drugs used to kill bacteria. Bacteria can cause infections. These include strep throat, ear infections, and pneumonia. These medicines can't cure everything. They don't kill viruses or help with allergies. They don't help illnesses such as the common cold, the flu, or a runny nose. And they can cause side effects. There are many types of antibiotics. Your doctor will decide which one will work best for your infection. Examples include:  · Amoxicillin. · Cephalexin (Keflex). · Ciprofloxacin (Cipro). What are the possible side effects? Side effects can include:  · Nausea. · Diarrhea. · Skin rash. · Yeast infection. · A severe allergic reaction. It may cause itching, swelling, and breathing problems. This is rare. You may have other side effects or reactions not listed here. Check the information that comes with your medicine. Should you take antibiotics just in case? Don't take antibiotics when you don't need them. If you do that, they may not work when you do need them. Each time you take antibiotics, you are more likely to have some bacteria that survive and aren't killed by the medicine. Bacteria that don't die can change and become even harder to kill. These are called antibiotic-resistant bacteria. They can cause longer and more serious infections. To treat them, you may need different, stronger antibiotics that have more side effects and may cost more. So always ask your doctor if antibiotics are the best treatment. Explain that you do not want antibiotics unless you need them.   Help protect the community  Using antibiotics when they're not needed leads to the development of antibiotic-resistant bacteria. These tougher bacteria can spread to family members, children, and coworkers. People in your community will have a risk of getting an infection that is harder to cure and that costs more to treat. How can you take antibiotics safely? Be safe with medicine. Take your antibiotics as directed. Do not stop taking them just because you feel better. You need to take the full course of medicine. This will help make sure your infection is cured. It will also help prevent the growth of antibiotic-resistant bacteria. Always take the exact amount that the label says to take. If the label says to take the medicine at a certain time, follow those directions. You might feel better after you take an antibiotic for a few days. But it is important to keep taking it for as long as prescribed. That will help you get rid of those bacteria that are a bit stronger and that survive the first few days of treatment. Where can you learn more? Go to https://[facility]ChatID. org and sign in to your [employer] account. Enter J738 in the Adpoints box to learn more about \"Learning About the Safe Use of Antibiotics. \"     If you do not have an account, please click on the \"Sign Up Now\" link. Current as of: June 9, 2019  Content Version: 12.3  © 2119-7196 [employer]wise, Incorporated. Care instructions adapted under license by [facility] ([facility]). If you have questions about a medical condition or this instruction, always ask your healthcare professional. [place] [address] any warranty or liability for your use of this information.

## 2020-03-13 ASSESSMENT — ENCOUNTER SYMPTOMS
RHINORRHEA: 0
WHEEZING: 0
SHORTNESS OF BREATH: 0
CONSTIPATION: 0
CHEST TIGHTNESS: 1
ABDOMINAL PAIN: 0
SORE THROAT: 1
DIARRHEA: 0
BACK PAIN: 0
VOMITING: 0
SINUS PRESSURE: 0
COUGH: 1
NAUSEA: 0
ABDOMINAL DISTENTION: 0
SINUS PAIN: 0
EYE DISCHARGE: 0
EYES NEGATIVE: 1

## 2020-10-21 ENCOUNTER — OFFICE VISIT (OUTPATIENT)
Dept: FAMILY MEDICINE CLINIC | Age: 45
End: 2020-10-21
Payer: COMMERCIAL

## 2020-10-21 VITALS
OXYGEN SATURATION: 97 % | TEMPERATURE: 97 F | WEIGHT: 173 LBS | SYSTOLIC BLOOD PRESSURE: 108 MMHG | DIASTOLIC BLOOD PRESSURE: 68 MMHG | HEART RATE: 78 BPM | HEIGHT: 69 IN | BODY MASS INDEX: 25.62 KG/M2

## 2020-10-21 PROCEDURE — 99213 OFFICE O/P EST LOW 20 MIN: CPT | Performed by: FAMILY MEDICINE

## 2020-10-21 RX ORDER — CYCLOBENZAPRINE HCL 10 MG
10 TABLET ORAL 3 TIMES DAILY PRN
Qty: 30 TABLET | Refills: 0 | Status: SHIPPED | OUTPATIENT
Start: 2020-10-21 | End: 2020-10-31

## 2020-10-21 RX ORDER — IBUPROFEN 800 MG/1
800 TABLET ORAL EVERY 8 HOURS PRN
Qty: 60 TABLET | Refills: 0 | Status: SHIPPED | OUTPATIENT
Start: 2020-10-21

## 2020-10-21 RX ORDER — METHYLPREDNISOLONE 4 MG/1
TABLET ORAL
Qty: 1 KIT | Refills: 0 | Status: SHIPPED | OUTPATIENT
Start: 2020-10-21

## 2020-10-21 SDOH — ECONOMIC STABILITY: FOOD INSECURITY: WITHIN THE PAST 12 MONTHS, THE FOOD YOU BOUGHT JUST DIDN'T LAST AND YOU DIDN'T HAVE MONEY TO GET MORE.: NEVER TRUE

## 2020-10-21 SDOH — ECONOMIC STABILITY: TRANSPORTATION INSECURITY
IN THE PAST 12 MONTHS, HAS THE LACK OF TRANSPORTATION KEPT YOU FROM MEDICAL APPOINTMENTS OR FROM GETTING MEDICATIONS?: NO

## 2020-10-21 SDOH — ECONOMIC STABILITY: FOOD INSECURITY: WITHIN THE PAST 12 MONTHS, YOU WORRIED THAT YOUR FOOD WOULD RUN OUT BEFORE YOU GOT MONEY TO BUY MORE.: NEVER TRUE

## 2020-10-21 SDOH — ECONOMIC STABILITY: TRANSPORTATION INSECURITY
IN THE PAST 12 MONTHS, HAS LACK OF TRANSPORTATION KEPT YOU FROM MEETINGS, WORK, OR FROM GETTING THINGS NEEDED FOR DAILY LIVING?: NO

## 2020-10-21 SDOH — ECONOMIC STABILITY: INCOME INSECURITY: HOW HARD IS IT FOR YOU TO PAY FOR THE VERY BASICS LIKE FOOD, HOUSING, MEDICAL CARE, AND HEATING?: NOT HARD AT ALL

## 2020-10-21 NOTE — PROGRESS NOTES
Subjective:      Chief Complaint   Patient presents with    Neck Pain     Pt stated his neck has been sore x3 months - in th last two months been going to chiropracter and massaging. Temporary relief, has affected his sleep, some days are better than others     Health Maintenance     Pt will be getting flu shot at work within the nxt couple weeks         Harinder Paredes is a 41 y.o. male who presents for evaluation of neck pain. Started in July, actually following a couples massage  [place] maybe at an odd angle for extended period    Chiropractic weekly and has had a few neck massages  Just adjustments    Temporary relief    xrays done at chiropractor    Taking advil on occasion  3 days/week, will take 3 pills     Patient's medications, allergies, past medical, surgical, social and family histories were reviewed and updated as appropriate. Review of Systems  Otherwise physically feels healthy without sob/palpitations/chest pain/f/c/n/v/weight gain/weight loss/abd pain     Objective:      /68 (Site: Left Upper Arm, Position: Sitting, Cuff Size: Medium Adult)   Pulse 78   Temp 97 °F (36.1 °C) (Infrared)   Ht 5' 9\" (1.753 m)   Wt 173 lb (78.5 kg)   SpO2 97%   BMI 25.55 kg/m²   General:   alert, appears stated age and cooperative   External Deformity:  absent   ROM Cervical Spine:  See below   Midline Tenderness:  absent midline   Paraspinous tenderness:  moderate on the left   UE Neurologic Exam:  unremarkable, normal strength, normal sensation, normal reflexes     Pain with rotation to right and extension  Tenderness left paraspinal cervical musculature into the trapezius      X-ray of the cervical spine: to be read   Assessment:      Diagnosis Orders   1.  Cervicalgia  XR CERVICAL SPINE (4-5 VIEWS)    Ambulatory referral to Physical Therapy    methylPREDNISolone (MEDROL DOSEPACK) 4 MG tablet    cyclobenzaprine (FLEXERIL) 10 MG tablet    ibuprofen (ADVIL;MOTRIN) 800 MG tablet        Plan:      Discussed the

## 2023-09-02 ENCOUNTER — OFFICE VISIT (OUTPATIENT)
Dept: FAMILY MEDICINE CLINIC | Age: 48
End: 2023-09-02

## 2023-09-02 DIAGNOSIS — J40 BRONCHITIS: Primary | ICD-10-CM

## 2023-09-02 DIAGNOSIS — R53.83 FATIGUE, UNSPECIFIED TYPE: ICD-10-CM

## 2023-09-02 DIAGNOSIS — R05.1 ACUTE COUGH: ICD-10-CM

## 2023-09-02 DIAGNOSIS — J02.9 SORE THROAT: ICD-10-CM

## 2023-09-02 LAB
Lab: NORMAL
PERFORMING INSTRUMENT: NORMAL
QC PASS/FAIL: NORMAL
SARS-COV-2, POC: NORMAL

## 2023-09-02 RX ORDER — AZITHROMYCIN 250 MG/1
TABLET, FILM COATED ORAL
Qty: 6 TABLET | Refills: 0 | Status: SHIPPED | OUTPATIENT
Start: 2023-09-02

## 2023-09-02 RX ORDER — DEXTROMETHORPHAN HYDROBROMIDE AND PROMETHAZINE HYDROCHLORIDE 15; 6.25 MG/5ML; MG/5ML
5 SYRUP ORAL 4 TIMES DAILY PRN
Qty: 150 ML | Refills: 0 | Status: SHIPPED | OUTPATIENT
Start: 2023-09-02 | End: 2023-09-12

## 2023-09-02 SDOH — ECONOMIC STABILITY: FOOD INSECURITY: WITHIN THE PAST 12 MONTHS, THE FOOD YOU BOUGHT JUST DIDN'T LAST AND YOU DIDN'T HAVE MONEY TO GET MORE.: NEVER TRUE

## 2023-09-02 SDOH — ECONOMIC STABILITY: INCOME INSECURITY: HOW HARD IS IT FOR YOU TO PAY FOR THE VERY BASICS LIKE FOOD, HOUSING, MEDICAL CARE, AND HEATING?: NOT HARD AT ALL

## 2023-09-02 SDOH — ECONOMIC STABILITY: HOUSING INSECURITY
IN THE LAST 12 MONTHS, WAS THERE A TIME WHEN YOU DID NOT HAVE A STEADY PLACE TO SLEEP OR SLEPT IN A SHELTER (INCLUDING NOW)?: NO

## 2023-09-02 SDOH — ECONOMIC STABILITY: FOOD INSECURITY: WITHIN THE PAST 12 MONTHS, YOU WORRIED THAT YOUR FOOD WOULD RUN OUT BEFORE YOU GOT MONEY TO BUY MORE.: NEVER TRUE

## 2023-09-02 ASSESSMENT — PATIENT HEALTH QUESTIONNAIRE - PHQ9
2. FEELING DOWN, DEPRESSED OR HOPELESS: 0
1. LITTLE INTEREST OR PLEASURE IN DOING THINGS: 0
SUM OF ALL RESPONSES TO PHQ QUESTIONS 1-9: 0
SUM OF ALL RESPONSES TO PHQ9 QUESTIONS 1 & 2: 0

## 2023-09-02 ASSESSMENT — ENCOUNTER SYMPTOMS
COUGH: 1
SHORTNESS OF BREATH: 1
SORE THROAT: 1
NAUSEA: 0
RHINORRHEA: 0
WHEEZING: 0
DIARRHEA: 0
CHEST TIGHTNESS: 1

## 2023-09-02 NOTE — PROGRESS NOTES
tablet     Refill:  0    promethazine-dextromethorphan (PROMETHAZINE-DM) 6.25-15 MG/5ML syrup     Sig: Take 5 mLs by mouth 4 times daily as needed for Cough     Dispense:  150 mL     Refill:  0         Return if symptoms worsen or fail to improve, for follow up with PCP. Reviewed with the patient: current clinical status & medications. Side effects, adverse effects of the medications prescribed today, as well as treatment plan/rationale and result expectations have been discussed with the patient who expressed understanding. Close follow up to evaluate treatment results and for coordination of care. I have reviewed the patient's medical history in detail and updated the computerized patient record.       Mickeal Kussmaul, APRN - NP

## 2023-09-03 VITALS
WEIGHT: 189 LBS | BODY MASS INDEX: 27.91 KG/M2 | OXYGEN SATURATION: 97 % | SYSTOLIC BLOOD PRESSURE: 122 MMHG | HEART RATE: 66 BPM | TEMPERATURE: 98.6 F | DIASTOLIC BLOOD PRESSURE: 80 MMHG

## 2023-10-08 PROBLEM — H18.529 MAP-DOT-FINGERPRINT CORNEAL DYSTROPHY: Status: ACTIVE | Noted: 2023-10-08

## 2023-10-08 PROBLEM — H18.413 ARCUS SENILIS, BILATERAL: Status: ACTIVE | Noted: 2023-10-08

## 2023-10-08 PROBLEM — M75.102 TEAR OF LEFT SUPRASPINATUS TENDON: Status: ACTIVE | Noted: 2023-10-08

## 2023-10-08 PROBLEM — J34.89 NASAL VESTIBULITIS: Status: ACTIVE | Noted: 2023-10-08

## 2023-10-08 PROBLEM — H18.832 RECURRENT EROSION OF CORNEA, LEFT EYE: Status: ACTIVE | Noted: 2023-10-08

## 2023-10-08 PROBLEM — H00.15 CHALAZION LEFT LOWER EYELID: Status: ACTIVE | Noted: 2023-10-08

## 2023-10-08 RX ORDER — VALACYCLOVIR HYDROCHLORIDE 1 G/1
TABLET, FILM COATED ORAL
COMMUNITY

## 2023-10-08 RX ORDER — MELOXICAM 15 MG/1
1 TABLET ORAL DAILY
COMMUNITY

## 2023-10-10 ENCOUNTER — OFFICE VISIT (OUTPATIENT)
Dept: ORTHOPEDIC SURGERY | Facility: CLINIC | Age: 48
End: 2023-10-10
Payer: COMMERCIAL

## 2023-10-10 VITALS — HEIGHT: 69 IN | WEIGHT: 188 LBS | BODY MASS INDEX: 27.85 KG/M2

## 2023-10-10 DIAGNOSIS — M25.511 RIGHT SHOULDER PAIN, UNSPECIFIED CHRONICITY: Primary | ICD-10-CM

## 2023-10-10 DIAGNOSIS — M24.811 INTERNAL DERANGEMENT OF SHOULDER, RIGHT: ICD-10-CM

## 2023-10-10 PROCEDURE — 99214 OFFICE O/P EST MOD 30 MIN: CPT | Performed by: ORTHOPAEDIC SURGERY

## 2023-10-10 PROCEDURE — 1036F TOBACCO NON-USER: CPT | Performed by: ORTHOPAEDIC SURGERY

## 2023-10-10 ASSESSMENT — PAIN - FUNCTIONAL ASSESSMENT: PAIN_FUNCTIONAL_ASSESSMENT: 0-10

## 2023-10-10 ASSESSMENT — PAIN SCALES - GENERAL: PAINLEVEL_OUTOF10: 3

## 2023-10-10 NOTE — PROGRESS NOTES
History of Present Illness   Chief Complaint   Patient presents with    Right Shoulder - Follow-up     S/p injection on 09-07-23       Patient returns today with side: right shoulder pain.  At the last visit he was given meloxicam and a steroid injection into the right shoulder.  He states for about 3 weeks he had great relief however the injection has since worn off and his pain is returning.  He notes he does do [employer] and has stopped all overhead lifting.    Past Medical History:   Diagnosis Date    Personal history of other infectious and parasitic diseases     History of herpes genitalis       Medication Documentation Review Audit       Reviewed by Jeferson Barber (Technologist) on 10/10/23 at 0818      Medication Order Taking? Sig Documenting Provider Last Dose Status   meloxicam (Mobic) 15 mg tablet [id number]  Take 1 tablet (15 mg) by mouth once daily. Take with food Historical Provider, MD  Active   valACYclovir (Valtrex) 1 gram tablet [id number]  Take by mouth. Historical Provider, MD  Active                    Allergies   Allergen Reactions    Aspirin Unknown       Social History     Socioeconomic History    Marital status:      Spouse name: Not on file    Number of children: Not on file    Years of education: Not on file    Highest education level: Not on file   Occupational History    Not on file   Tobacco Use    Smoking status: Former     Types: Cigarettes    Smokeless tobacco: Never   Substance and Sexual Activity    Alcohol use: Not on file    Drug use: Not on file    Sexual activity: Not on file   Other Topics Concern    Not on file   Social History Narrative    Not on file     Social Determinants of Health     Financial Resource Strain: Not on file   Food Insecurity: Not on file   Transportation Needs: Not on file   Physical Activity: Not on file   Stress: Not on file   Social Connections: Not on file   Intimate Partner Violence: Not on file   Housing Stability: Not on file       Past  Surgical History:   Procedure Laterality Date    OTHER SURGICAL HISTORY  08/07/2019    Vasectomy       BMI  27     Review of Systems   GENERAL: Negative for malaise, significant weight loss, fever  MUSCULOSKELETAL: see HPI  NEURO:  Negative     Physical Exam  This is a 48-year-old male in no acute distress  Neck is supple nontender, Spurling's test is negative  side: right Shoulder:  There is mild tenderness to palpation over the acromioclavicular joint.  Active range of motion: Forward elevation 170, internal rotation L1, external rotation 90 abduction 80  Jobes test positive for pain negative for weakness  External rotation test positive for pain negative for weakness  Belly press test negative  Rochester's test positive  Lift off test negative  Elbow and wrist motion were not irritable.  Radial pulse 2+ and palpable.     Imaging:  XR shoulder  Interpreted By:  TYLER MÁRQUEZ MD  MRN: [id number]  Patient Name: MUKUL HERNÁNDEZ     STUDY:  SHOULDER, CMPLT, MIN 2 VIEWS;  Right;  9/7/2023 3:34 pm     INDICATION:  pain  M25.519: Shoulder pain.     ACCESSION NUMBER(S):  [id number]     ORDERING CLINICIAN:  TYLER MÁRQUEZ     FINDINGS:  Right shoulder films are negative for fracture, dislocation or  destructive lesion. The joint spaces are maintained.            Assessment:  Patient with side: right shoulder  internal derangement, concern for rotator cuff versus labral pathology     Plan:  Right shoulder MRI arthrogram  Follow-up after the imaging studies to go over the results and formulate a treatment plan  All questions answered

## 2024-04-04 ENCOUNTER — OFFICE VISIT (OUTPATIENT)
Dept: FAMILY MEDICINE CLINIC | Age: 49
End: 2024-04-04
Payer: COMMERCIAL

## 2024-04-04 VITALS
OXYGEN SATURATION: 98 % | HEIGHT: 69 IN | WEIGHT: 204.2 LBS | SYSTOLIC BLOOD PRESSURE: 134 MMHG | TEMPERATURE: 97.2 F | BODY MASS INDEX: 30.24 KG/M2 | DIASTOLIC BLOOD PRESSURE: 78 MMHG | HEART RATE: 84 BPM

## 2024-04-04 DIAGNOSIS — M54.31 RIGHT SIDED SCIATICA: ICD-10-CM

## 2024-04-04 DIAGNOSIS — M54.50 ACUTE MIDLINE LOW BACK PAIN, UNSPECIFIED WHETHER SCIATICA PRESENT: ICD-10-CM

## 2024-04-04 DIAGNOSIS — Z00.00 PREVENTATIVE HEALTH CARE: Primary | ICD-10-CM

## 2024-04-04 PROCEDURE — 99396 PREV VISIT EST AGE 40-64: CPT | Performed by: FAMILY MEDICINE

## 2024-04-04 RX ORDER — MELOXICAM 15 MG/1
15 TABLET ORAL DAILY
COMMUNITY
Start: 2023-09-07

## 2024-04-04 RX ORDER — PREDNISONE 20 MG/1
TABLET ORAL
COMMUNITY
Start: 2024-03-31

## 2024-04-04 ASSESSMENT — PATIENT HEALTH QUESTIONNAIRE - PHQ9
SUM OF ALL RESPONSES TO PHQ QUESTIONS 1-9: 0
SUM OF ALL RESPONSES TO PHQ QUESTIONS 1-9: 0
2. FEELING DOWN, DEPRESSED OR HOPELESS: NOT AT ALL
1. LITTLE INTEREST OR PLEASURE IN DOING THINGS: NOT AT ALL
SUM OF ALL RESPONSES TO PHQ QUESTIONS 1-9: 0
SUM OF ALL RESPONSES TO PHQ9 QUESTIONS 1 & 2: 0
SUM OF ALL RESPONSES TO PHQ QUESTIONS 1-9: 0

## 2024-04-04 NOTE — PROGRESS NOTES
Subjective  Chief Complaint   Patient presents with    Back Pain     Lower back pain over last few weeks, has been better last 3 days     Health Maintenance     Declined kathryn Pino is a 48 y.o. male    Back Pain  Patient presents for evaluation of mid and low back problems.    Hurting for the last 3 weeks    Called insurance/telehealth on easter  Called in steroid and muscle relaxer  Helping     Initial inciting event: none.   Alleviating factors identifiable by the patient are  certain positions .   Aggravating factors identifiable by the patient are  pressure on a specific spot in the spine  and bending forward    Not debilitating, not missing work, etc.     Treatments initiated by the patient: chiropractic  Sacroiliitis      Previous lower back problems: last year    Sitting job, not lifting/labor    Previous work up: xrays  LUMBAR SPINE  IMPRESSION      LEFT L5 PARS DEFECT. LUMBER SPINE OTHERWISE NEGATIVE.         THORACIC SPINE TWO VIEWS            IMPRESSION      NEGATIVE RADIOGRAPHS OF THE THORACIC SPINE. NO ACUTE FRACTURE.          ROS  Constitutional: negative for chills, fevers, malaise and sweats  Respiratory: negative for cough, dyspnea on exertion and shortness of breath  Cardiovascular: negative for chest pain and palpitations  Musculoskeletal:positive for back pain and  negative for muscle weakness  Neurological: negative for coordination problems, gait problems, tremors, no bowel or bladder incontinence, no saddle anesthesia    Objective  Physical Exam:  /78 (Site: Right Upper Arm)   Pulse 84   Temp 97.2 °F (36.2 °C)   Ht 1.753 m (5' 9\")   Wt 92.6 kg (204 lb 3.2 oz)   SpO2 98%   BMI 30.16 kg/m²     Gen: Well, NAD, Alert, Oriented x 3   HEENT: EOMI, eyes clear, MMM  Skin: without rash or jaundice  Neck: no significant lymphadenopathy or thyromegaly  Lungs: CTA B w/out Rales/Wheezes/Rhonchi, Good respiratory effort   Heart: RRR, S1S2, w/out M/R/G, non-displaced PMI   Back: he